# Patient Record
Sex: FEMALE | URBAN - METROPOLITAN AREA
[De-identification: names, ages, dates, MRNs, and addresses within clinical notes are randomized per-mention and may not be internally consistent; named-entity substitution may affect disease eponyms.]

---

## 2022-11-11 ENCOUNTER — APPOINTMENT (RX ONLY)
Dept: URBAN - METROPOLITAN AREA CLINIC 15 | Facility: CLINIC | Age: 42
Setting detail: DERMATOLOGY
End: 2022-11-11

## 2022-11-11 ENCOUNTER — RX ONLY (OUTPATIENT)
Age: 42
Setting detail: RX ONLY
End: 2022-11-11

## 2022-11-11 VITALS — WEIGHT: 120 LBS | HEIGHT: 64 IN

## 2022-11-11 DIAGNOSIS — Z41.9 ENCOUNTER FOR PROCEDURE FOR PURPOSES OTHER THAN REMEDYING HEALTH STATE, UNSPECIFIED: ICD-10-CM

## 2022-11-11 PROCEDURE — ? BOTOX

## 2022-11-11 RX ORDER — PIMECROLIMUS 10 MG/G
1 CREAM TOPICAL
Qty: 100 | Refills: 3 | Status: ERX | COMMUNITY
Start: 2022-11-11

## 2022-11-11 NOTE — PROCEDURE: BOTOX
Show Lateral Platysmal Band Units: Yes
Show Right And Left Pupillary Line Units: No
Additional Area 5 Units: 0
Additional Area 6 Location: anterior neck
Additional Area 3 Location: left periorbital area
Lot #: [id number]
Detail Level: Zone
Additional Area 3 Units: 10
Forehead Units: 15
Additional Area 1 Location: chin
Additional Area 4 Location: right periorbital skin
Show Inventory Tab: Show
Consent: Written consent obtained. Risks include but not limited to lid/brow ptosis, bruising, swelling, diplopia, temporary effect, incomplete chemical denervation.
Post-Care Instructions: Patient instructed to not lie down for 4 hours and limit physical activity for 24 hours. Patient instructed not to travel by airplane for 48 hours.
Additional Area 4 Units: 8
Dilution (U/0.1 Cc): 1
Expiration Date (Month Year): 12/24

## 2022-11-29 ENCOUNTER — APPOINTMENT (RX ONLY)
Dept: URBAN - METROPOLITAN AREA CLINIC 15 | Facility: CLINIC | Age: 42
Setting detail: DERMATOLOGY
End: 2022-11-29

## 2022-11-29 DIAGNOSIS — Z41.9 ENCOUNTER FOR PROCEDURE FOR PURPOSES OTHER THAN REMEDYING HEALTH STATE, UNSPECIFIED: ICD-10-CM

## 2022-11-29 PROCEDURE — ? ADDITIONAL NOTES

## 2022-11-29 PROCEDURE — ? BOTOX

## 2022-11-29 ASSESSMENT — LOCATION ZONE DERM: LOCATION ZONE: FACE

## 2022-11-29 ASSESSMENT — LOCATION DETAILED DESCRIPTION DERM
LOCATION DETAILED: RIGHT FOREHEAD
LOCATION DETAILED: RIGHT INFERIOR FOREHEAD
LOCATION DETAILED: LEFT INFERIOR FOREHEAD
LOCATION DETAILED: LEFT INFERIOR LATERAL FOREHEAD
LOCATION DETAILED: RIGHT INFERIOR LATERAL FOREHEAD
LOCATION DETAILED: RIGHT INFERIOR MEDIAL FOREHEAD

## 2022-11-29 ASSESSMENT — LOCATION SIMPLE DESCRIPTION DERM
LOCATION SIMPLE: LEFT FOREHEAD
LOCATION SIMPLE: RIGHT FOREHEAD

## 2022-11-29 NOTE — PROCEDURE: BOTOX
Show Depressor Anguli Units: Yes
Show Ucl Units: No
Additional Area 4 Location: right periorbital skin
Additional Area 1 Location: chin
Glabellar Complex Units: 0
Post-Care Instructions: Patient instructed to not lie down for 4 hours and limit physical activity for 24 hours. Patient instructed not to travel by airplane for 48 hours.
Expiration Date (Month Year): 12/24
Lot #: [id number]
Additional Area 2 Location: left periorbital area
Dilution (U/0.1 Cc): 1
Detail Level: Zone
Additional Area 6 Location: anterior neck
Comments: Box 40/1
Consent: Written consent obtained. Risks include but not limited to lid/brow ptosis, bruising, swelling, diplopia, temporary effect, incomplete chemical denervation.
Show Inventory Tab: Show
Forehead Units: 7

## 2023-06-16 ENCOUNTER — APPOINTMENT (RX ONLY)
Dept: URBAN - METROPOLITAN AREA CLINIC 15 | Facility: CLINIC | Age: 43
Setting detail: DERMATOLOGY
End: 2023-06-16

## 2023-06-16 VITALS — WEIGHT: 120 LBS | HEIGHT: 64 IN

## 2023-06-16 DIAGNOSIS — Z41.9 ENCOUNTER FOR PROCEDURE FOR PURPOSES OTHER THAN REMEDYING HEALTH STATE, UNSPECIFIED: ICD-10-CM

## 2023-06-16 PROCEDURE — ? BOTOX

## 2023-06-16 NOTE — HPI: COSMETIC (BOTOX)
Have You Had Botox Before?: has had botox
Additional History: Patient is in office for Botox 3 areas as promo. $600.
When Was Your Last Botox Treatment?: 11/11/2022

## 2023-06-16 NOTE — PROCEDURE: BOTOX
L Brow Units: 0
Show Lateral Platysmal Band Units: Yes
Additional Area 4 Location: left eye
Show Right And Left Brow Units: No
Incrementing Botox Units: By 0.5 Units
Consent: Written consent obtained. Risks include but not limited to lid/brow ptosis, bruising, swelling, diplopia, temporary effect, incomplete chemical denervation.
Show Inventory Tab: Show
Comments: Box 15/1
Additional Area 1 Location: mesotherapy
Additional Area 5 Location: upper lip
Reconstitution Date (Optional): 24;66/3670
Additional Area 2 Location: under eye
Dilution (U/0.1 Cc): 1
Additional Area 3 Units: 8
Additional Area 6 Location: chin
Forehead Units: [address] [facility]
Expiration Date (Month Year): 12/24
Glabellar Complex Units: 15
Additional Area 3 Location: right eye
Lot #: [id number]
Detail Level: Zone
Additional Area 4 Units: 10
Post-Care Instructions: Patient instructed to not lie down for 4 hours and limit physical activity for 24 hours. Patient instructed not to travel by airplane for 48 hours.

## 2023-11-10 ENCOUNTER — APPOINTMENT (RX ONLY)
Dept: URBAN - METROPOLITAN AREA CLINIC 15 | Facility: CLINIC | Age: 43
Setting detail: DERMATOLOGY
End: 2023-11-10

## 2023-11-10 DIAGNOSIS — Z41.9 ENCOUNTER FOR PROCEDURE FOR PURPOSES OTHER THAN REMEDYING HEALTH STATE, UNSPECIFIED: ICD-10-CM

## 2023-11-10 PROCEDURE — ? BOTOX

## 2023-11-10 NOTE — PROCEDURE: BOTOX
L Brow Units: 0
Show Lateral Platysmal Band Units: Yes
Additional Area 4 Location: left eye
Show Right And Left Brow Units: No
Incrementing Botox Units: By 0.5 Units
Consent: Written consent obtained. Risks include but not limited to lid/brow ptosis, bruising, swelling, diplopia, temporary effect, incomplete chemical denervation.
Show Inventory Tab: Show
Comments: Box 15/1
Additional Area 1 Location: mesotherapy
Additional Area 5 Location: upper lip
Reconstitution Date (Optional): 68;45/5733
Additional Area 2 Location: under eye
Dilution (U/0.1 Cc): 1
Additional Area 3 Units: 8
Additional Area 6 Location: chin
Forehead Units: [address] [facility]
Expiration Date (Month Year): 12/24
Glabellar Complex Units: 15
Additional Area 3 Location: right eye
Lot #: [id number]
Detail Level: Zone
Additional Area 4 Units: 10
Post-Care Instructions: Patient instructed to not lie down for 4 hours and limit physical activity for 24 hours. Patient instructed not to travel by airplane for 48 hours.